# Patient Record
Sex: FEMALE | Race: WHITE | NOT HISPANIC OR LATINO | URBAN - METROPOLITAN AREA
[De-identification: names, ages, dates, MRNs, and addresses within clinical notes are randomized per-mention and may not be internally consistent; named-entity substitution may affect disease eponyms.]

---

## 2024-11-15 ENCOUNTER — OFFICE VISIT (OUTPATIENT)
Dept: URGENT CARE | Age: 74
End: 2024-11-15
Payer: MEDICARE

## 2024-11-15 VITALS
RESPIRATION RATE: 16 BRPM | HEART RATE: 81 BPM | OXYGEN SATURATION: 98 % | DIASTOLIC BLOOD PRESSURE: 87 MMHG | SYSTOLIC BLOOD PRESSURE: 142 MMHG

## 2024-11-15 DIAGNOSIS — K13.0 DRY LIPS: Primary | ICD-10-CM

## 2024-11-15 PROCEDURE — 99213 OFFICE O/P EST LOW 20 MIN: CPT

## 2024-11-17 ASSESSMENT — ENCOUNTER SYMPTOMS
TROUBLE SWALLOWING: 0
VOICE CHANGE: 0
STRIDOR: 0
COUGH: 0
WHEEZING: 0
FEVER: 0
SHORTNESS OF BREATH: 0

## 2024-11-17 NOTE — PROGRESS NOTES
Subjective   Patient ID: Lila Arce is a 74 y.o. female. They present today with a chief complaint of Rash (Hives on mouth).    HISTORY OF PRESENT ILLNESS:    Patient presents to the clinic for upper and lower lip itching, dryness, irritation, and puffiness x 1 day. States that her lips feel like plastic. Denies new recent medications. Denies new additions to diet. Denies new facial products or other exposures which may be causing sensitivity/allergic reaction. States she has applied Vaseline. Denies systemic symptoms.    Past Medical History  Allergies as of 11/15/2024 - never reviewed   Allergen Reaction Noted    Bacitracin Unknown 11/15/2024       No current outpatient medications      No past medical history on file.    Past Surgical History:   Procedure Laterality Date    OTHER SURGICAL HISTORY  04/12/2022    Tonsillectomy with adenoidectomy    OTHER SURGICAL HISTORY  04/12/2022    Surgery    OTHER SURGICAL HISTORY  04/12/2022    Cataract surgery    OTHER SURGICAL HISTORY  04/12/2022    Bartholin's cyst excision            Review of Systems    Review of Systems   Constitutional:  Negative for fever.   HENT:  Negative for drooling, trouble swallowing and voice change.         (+) lip dryness, itchiness, puffiness   Respiratory:  Negative for cough, shortness of breath, wheezing and stridor.    Cardiovascular:  Negative for chest pain.   Skin:  Negative for rash.                                 Objective    Vitals:    11/15/24 1127   BP: 142/87   Pulse: 81   Resp: 16   SpO2: 98%     No LMP recorded.  PHYSICAL EXAMINATION:    Physical Exam  Vitals and nursing note reviewed.   Constitutional:       General: She is not in acute distress.     Appearance: Normal appearance. She is not ill-appearing.      Comments: Speaks in full, unlabored sentences.   HENT:      Head: Normocephalic and atraumatic.      Nose: Nose normal.      Mouth/Throat:      Lips: Pink. No lesions.      Mouth: Mucous membranes are dry.       Pharynx: Oropharynx is clear. No oropharyngeal exudate or posterior oropharyngeal erythema.      Comments: Airway patent.  Eyes:      General:         Right eye: No discharge.         Left eye: No discharge.      Extraocular Movements: Extraocular movements intact.      Conjunctiva/sclera: Conjunctivae normal.   Cardiovascular:      Rate and Rhythm: Normal rate.   Pulmonary:      Effort: Pulmonary effort is normal. No respiratory distress.      Breath sounds: Normal breath sounds. No stridor. No wheezing, rhonchi or rales.   Musculoskeletal:      Cervical back: Normal range of motion and neck supple.   Skin:     General: Skin is warm and dry.   Neurological:      General: No focal deficit present.      Mental Status: She is alert and oriented to person, place, and time.   Psychiatric:         Mood and Affect: Mood normal.         Behavior: Behavior normal.          Procedures    No results found for this visit on 11/15/24.    Diagnostic study results (if any) were reviewed by La Talyor PA-C.    Assessment/Plan   Allergies, medications, history, and pertinent labs/EKGs/Imaging reviewed by La Taylor PA-C.     Orders and Diagnoses  Diagnoses and all orders for this visit:  Dry lips      Medical Admin Record    Given overall well appearance, vital signs, history, and exam as above, there is no indication for further emergent testing/intervention at this time.      I discussed with the patient my clinical thoughts at this time given the above and we had a shared decision-making conversation in a patient-centered decision-making model on how to proceed forward. Pt was instructed on the importance of close follow-up. They were told that an urgent care diagnosis is often a preliminary impression and that definitive care is often not able to be given in the urgent care setting. Pt was educated that close follow-up is essential for good health and good outcomes. Patient was provided with the following  instructions:         May trial Benadryl or other anti-histamine to cover for possible allergy.    Recommend applying Aquaphor to lips.     Plenty of fluids and rest.      Good oral hygiene.      Follow up with PCP as soon as able.         Clinical impression as well as limitations of available testing/examination, all discussed with patient. Pt is well at this time in the urgent care. They are comfortable with the present assessment and plan to be discharged home. Discussed with them close outpatient follow up, reassessment, and possible further testing/treatment via their PCP/specialist; they agree, understand, and are comfortable with this plan. Pt given the opportunity to ask questions prior to discharge and all questions were answered at this time. Via our discussion, the patient was advised of warning signs and instructions were reviewed. Strict ED precautions were given, acknowledged, and understood. Discussed with the patient/family that it is okay to return to the urgent care at any time for anything. Advised to present to the ED if present condition changes/worsens or if they develop new symptoms at any time after discharge. Also, advised to go to the ED if they cannot establish outpatient follow-up in time frame specified above. Pt verbalized understanding and agreement with plan and instructions. Discussed the need for close follow up with their primary care provider and/or specialist for further testing/treatment/care/consultation in the short time frame as noted above - they understand these instructions and agree to close follow up for these reasons. Patient discharged home in stable condition.      Follow Up Instructions  No follow-ups on file.    Patient disposition: Home    Electronically signed by La Taylor PA-C  3:25 PM

## 2025-08-19 ENCOUNTER — OFFICE VISIT (OUTPATIENT)
Dept: URGENT CARE | Age: 75
End: 2025-08-19
Payer: MEDICARE

## 2025-08-19 VITALS
SYSTOLIC BLOOD PRESSURE: 165 MMHG | RESPIRATION RATE: 16 BRPM | OXYGEN SATURATION: 96 % | TEMPERATURE: 98.7 F | HEART RATE: 67 BPM | DIASTOLIC BLOOD PRESSURE: 88 MMHG

## 2025-08-19 DIAGNOSIS — H00.012 HORDEOLUM EXTERNUM OF RIGHT LOWER EYELID: Primary | ICD-10-CM

## 2025-08-19 PROCEDURE — 99070 SPECIAL SUPPLIES PHYS/QHP: CPT | Performed by: PHYSICIAN ASSISTANT

## 2025-08-19 PROCEDURE — 99213 OFFICE O/P EST LOW 20 MIN: CPT | Performed by: PHYSICIAN ASSISTANT

## 2025-08-19 PROCEDURE — 1036F TOBACCO NON-USER: CPT | Performed by: PHYSICIAN ASSISTANT

## 2025-08-19 PROCEDURE — 1159F MED LIST DOCD IN RCRD: CPT | Performed by: PHYSICIAN ASSISTANT

## 2025-08-19 PROCEDURE — 1160F RVW MEDS BY RX/DR IN RCRD: CPT | Performed by: PHYSICIAN ASSISTANT

## 2025-08-19 RX ORDER — ATENOLOL 50 MG/1
1 TABLET ORAL DAILY
COMMUNITY
Start: 2022-03-21

## 2025-08-19 RX ORDER — ASPIRIN 81 MG/1
81 TABLET ORAL
COMMUNITY
Start: 2025-02-05

## 2025-08-19 RX ORDER — ERYTHROMYCIN 5 MG/G
OINTMENT OPHTHALMIC NIGHTLY
Qty: 3.5 G | Refills: 0 | Status: SHIPPED | OUTPATIENT
Start: 2025-08-19 | End: 2025-08-29

## 2025-08-19 RX ORDER — ATORVASTATIN CALCIUM 40 MG/1
40 TABLET, FILM COATED ORAL
COMMUNITY
Start: 2025-02-05

## 2025-08-19 RX ORDER — ALLOPURINOL 100 MG/1
100 TABLET ORAL
COMMUNITY
Start: 2025-02-05